# Patient Record
Sex: FEMALE | Race: WHITE | NOT HISPANIC OR LATINO | Employment: FULL TIME | URBAN - METROPOLITAN AREA
[De-identification: names, ages, dates, MRNs, and addresses within clinical notes are randomized per-mention and may not be internally consistent; named-entity substitution may affect disease eponyms.]

---

## 2023-03-24 DIAGNOSIS — E06.3 HASHIMOTO'S DISEASE: Primary | ICD-10-CM

## 2024-06-05 ENCOUNTER — OFFICE VISIT (OUTPATIENT)
Dept: ENDOCRINOLOGY | Facility: CLINIC | Age: 29
End: 2024-06-05
Payer: COMMERCIAL

## 2024-06-05 ENCOUNTER — LAB VISIT (OUTPATIENT)
Dept: LAB | Facility: HOSPITAL | Age: 29
End: 2024-06-05
Attending: NURSE PRACTITIONER
Payer: COMMERCIAL

## 2024-06-05 VITALS
WEIGHT: 184.63 LBS | TEMPERATURE: 99 F | HEIGHT: 65 IN | SYSTOLIC BLOOD PRESSURE: 114 MMHG | HEART RATE: 92 BPM | DIASTOLIC BLOOD PRESSURE: 82 MMHG | RESPIRATION RATE: 20 BRPM | BODY MASS INDEX: 30.76 KG/M2

## 2024-06-05 DIAGNOSIS — E06.3 HYPOTHYROIDISM DUE TO HASHIMOTO'S THYROIDITIS: ICD-10-CM

## 2024-06-05 DIAGNOSIS — R79.89 LOW TESTOSTERONE LEVEL IN FEMALE: ICD-10-CM

## 2024-06-05 DIAGNOSIS — E06.3 HYPOTHYROIDISM DUE TO HASHIMOTO'S THYROIDITIS: Primary | ICD-10-CM

## 2024-06-05 DIAGNOSIS — E03.8 HYPOTHYROIDISM DUE TO HASHIMOTO'S THYROIDITIS: Primary | ICD-10-CM

## 2024-06-05 DIAGNOSIS — E03.8 HYPOTHYROIDISM DUE TO HASHIMOTO'S THYROIDITIS: ICD-10-CM

## 2024-06-05 LAB
CORTIS SERPL-SCNC: 10.4 UG/DL
T3FREE SERPL-MCNC: 3.35 PG/ML (ref 1.58–3.91)
T4 FREE SERPL-MCNC: 1.02 NG/DL (ref 0.7–1.48)
TESTOST SERPL-MCNC: 40.03 NG/DL (ref 13.84–53.35)
TSH SERPL-ACNC: 1.32 UIU/ML (ref 0.35–4.94)

## 2024-06-05 PROCEDURE — 84481 FREE ASSAY (FT-3): CPT

## 2024-06-05 PROCEDURE — 84443 ASSAY THYROID STIM HORMONE: CPT

## 2024-06-05 PROCEDURE — 99203 OFFICE O/P NEW LOW 30 MIN: CPT | Mod: S$PBB,,, | Performed by: NURSE PRACTITIONER

## 2024-06-05 PROCEDURE — 82627 DEHYDROEPIANDROSTERONE: CPT

## 2024-06-05 PROCEDURE — 3008F BODY MASS INDEX DOCD: CPT | Mod: CPTII,,, | Performed by: NURSE PRACTITIONER

## 2024-06-05 PROCEDURE — 84403 ASSAY OF TOTAL TESTOSTERONE: CPT

## 2024-06-05 PROCEDURE — 3074F SYST BP LT 130 MM HG: CPT | Mod: CPTII,,, | Performed by: NURSE PRACTITIONER

## 2024-06-05 PROCEDURE — 86376 MICROSOMAL ANTIBODY EACH: CPT

## 2024-06-05 PROCEDURE — 3079F DIAST BP 80-89 MM HG: CPT | Mod: CPTII,,, | Performed by: NURSE PRACTITIONER

## 2024-06-05 PROCEDURE — 83520 IMMUNOASSAY QUANT NOS NONAB: CPT

## 2024-06-05 PROCEDURE — 82533 TOTAL CORTISOL: CPT

## 2024-06-05 PROCEDURE — 36415 COLL VENOUS BLD VENIPUNCTURE: CPT

## 2024-06-05 PROCEDURE — 99214 OFFICE O/P EST MOD 30 MIN: CPT | Mod: PBBFAC | Performed by: NURSE PRACTITIONER

## 2024-06-05 PROCEDURE — 84445 ASSAY OF TSI GLOBULIN: CPT

## 2024-06-05 PROCEDURE — 1160F RVW MEDS BY RX/DR IN RCRD: CPT | Mod: CPTII,,, | Performed by: NURSE PRACTITIONER

## 2024-06-05 PROCEDURE — 1159F MED LIST DOCD IN RCRD: CPT | Mod: CPTII,,, | Performed by: NURSE PRACTITIONER

## 2024-06-05 PROCEDURE — 84439 ASSAY OF FREE THYROXINE: CPT

## 2024-06-05 RX ORDER — BUPROPION HYDROCHLORIDE 300 MG/1
300 TABLET ORAL DAILY
COMMUNITY
Start: 2024-05-29

## 2024-06-05 RX ORDER — ETONOGESTREL AND ETHINYL ESTRADIOL .015; .12 MG/D; MG/D
1 INSERT, EXTENDED RELEASE VAGINAL
COMMUNITY
Start: 2024-05-19

## 2024-06-05 RX ORDER — LEVOTHYROXINE SODIUM 50 UG/1
50 TABLET ORAL EVERY MORNING
COMMUNITY
Start: 2024-05-28

## 2024-06-05 RX ORDER — LISDEXAMFETAMINE DIMESYLATE 40 MG/1
40 CAPSULE ORAL DAILY
COMMUNITY
Start: 2024-05-29

## 2024-06-05 NOTE — PROGRESS NOTES
Subjective     Patient ID: Ronny Charles is a 28 y.o. female.    Chief Complaint: New Patient  (Referred for Hashimoto's Disease)    Endocrine Clinic Note 06/05/2024: 29 y/o female referred today as new pt for hx Hashimoto's thyroiditis. Currently on LT4 50 mcg. TPO on referral 15 H with range below 9. TSH 0.966 Free T4 1.30 on 03/08/2023 no recent labs on her chart. Referral also states low testosterone in female.  Patient states she was diagnosed at age 11 and has been on levothyroxine 50 mcg for multiple years.  Patient reports that she continues to gain weight she recently started compound GLP-1 8 weeks ago and has lost a total of 12 lbs.  Patient reports previously she was told she had low testosterone she was being tested due to fatigue and low libido.  On patient's referral testosterone level was 34.  Patient reports to continue to have fatigue, low libido, and weight gain but has been losing since being on a GLP 1.       Review of Systems   Constitutional:  Negative for activity change, appetite change and fatigue.   HENT:  Negative for dental problem, hearing loss, tinnitus, trouble swallowing and goiter.    Eyes:  Negative for photophobia, pain and visual disturbance.   Respiratory:  Negative for cough, chest tightness and wheezing.    Cardiovascular:  Negative for chest pain, palpitations and leg swelling.   Gastrointestinal:  Negative for abdominal pain, constipation, diarrhea, nausea and reflux.   Endocrine: Negative for cold intolerance, heat intolerance, polydipsia and polyphagia.   Genitourinary:  Negative for difficulty urinating, flank pain, hematuria, hot flashes, menstrual irregularity, menstrual problem, nocturia and urgency.   Musculoskeletal:  Negative for back pain, gait problem, joint swelling, leg pain and joint deformity.   Integumentary:  Negative for color change, pallor, rash and breast discharge.   Allergic/Immunologic: Negative for environmental allergies, food allergies and  immunocompromised state.   Neurological:  Negative for tremors, seizures, headaches, memory loss and coordination difficulties.   Psychiatric/Behavioral:  Negative for agitation, behavioral problems and sleep disturbance. The patient is not nervous/anxious.           Objective     Physical Exam  Constitutional:       General: She is not in acute distress.     Appearance: Normal appearance. She is not ill-appearing.   HENT:      Head: Normocephalic and atraumatic.      Right Ear: External ear normal.      Left Ear: External ear normal.      Nose: Nose normal. No congestion or rhinorrhea.      Mouth/Throat:      Mouth: Mucous membranes are moist.      Pharynx: Oropharynx is clear. No oropharyngeal exudate.   Eyes:      General:         Right eye: No discharge.         Left eye: No discharge.      Conjunctiva/sclera: Conjunctivae normal.      Pupils: Pupils are equal, round, and reactive to light.   Neck:      Thyroid: No thyroid mass, thyromegaly or thyroid tenderness.   Cardiovascular:      Rate and Rhythm: Normal rate and regular rhythm.      Pulses: Normal pulses.      Heart sounds: Normal heart sounds. No murmur heard.  Pulmonary:      Effort: Pulmonary effort is normal. No respiratory distress.      Breath sounds: Normal breath sounds.   Abdominal:      General: Abdomen is flat. Bowel sounds are normal. There is no distension.      Palpations: Abdomen is soft.      Tenderness: There is no abdominal tenderness.   Musculoskeletal:         General: No swelling or tenderness. Normal range of motion.      Cervical back: Normal range of motion and neck supple. No tenderness.      Right lower leg: No edema.      Left lower leg: No edema.   Feet:      Right foot:      Skin integrity: Skin integrity normal.      Left foot:      Skin integrity: Skin integrity normal.   Lymphadenopathy:      Cervical: No cervical adenopathy.   Skin:     General: Skin is warm and dry.      Coloration: Skin is not jaundiced or pale.    Neurological:      General: No focal deficit present.      Mental Status: She is alert and oriented to person, place, and time. Mental status is at baseline.      Coordination: Coordination normal.      Gait: Gait normal.   Psychiatric:         Mood and Affect: Mood normal.         Behavior: Behavior normal.         Thought Content: Thought content normal.            Assessment and Plan     1. Hypothyroidism due to Hashimoto's thyroiditis  LT4 50 mcg   Labs today TSH, free T4, free T3, TPO TSI, TraB  Adjust levothyroxine if needed  -     Ambulatory referral/consult to Endocrinology  -     T4, Free; Future  -     T3, Free (OLG); Future  -     TSH; Future  -     Thyrotropin Receptor Antibody; Future  -     Thyroid Stimulating Immunoglobulin; Future  -     THYROID PEROXIDASE (TPO); Future  -     Ambulatory referral/consult to Endocrinology    2. Low testosterone level in female  DHEA, testosterone cortisol levels today  -     DHEA-Sulfate; Future  -     Testosterone; Future  -     Cortisol; Future            Follow up in about 4 months (around 10/5/2024) for Hypothyroidism.    I spent a total of 30 minutes on the day of the visit.  This includes face to face time and non-face to face time preparing to see the patient (eg, review of tests), obtaining and/or reviewing separately obtained history, documenting clinical information in the electronic or other health record, independently interpreting results and communicating results to the patient/family/caregiver, or care coordinator.

## 2024-06-06 LAB
DHEA-S SERPL-MCNC: 184 MCG/DL (ref 83–377)
THYROID PEROXIDASE QUANT (OLG): 10 IU/ML
TSH RECEP AB SER-ACNC: <1.1 IU/L (ref 0–1.75)

## 2024-06-07 ENCOUNTER — PATIENT MESSAGE (OUTPATIENT)
Dept: ENDOCRINOLOGY | Facility: CLINIC | Age: 29
End: 2024-06-07
Payer: COMMERCIAL

## 2024-06-10 LAB — TSI SER-ACNC: <1 TSI INDEX

## 2024-10-16 ENCOUNTER — TELEPHONE (OUTPATIENT)
Dept: ENDOCRINOLOGY | Facility: CLINIC | Age: 29
End: 2024-10-16
Payer: COMMERCIAL

## 2024-10-16 DIAGNOSIS — R79.89 LOW TESTOSTERONE LEVEL IN FEMALE: Primary | ICD-10-CM

## 2024-10-16 NOTE — TELEPHONE ENCOUNTER
----- Message from Caron sent at 10/16/2024 10:25 AM CDT -----  Patient of Jade           Patient has been rescheduled to virtual visit on 12/12/2024    Patient stated, with last appointment Jade requested labs.    Can you please place orders for labs if needed ?       Thanks

## 2024-10-17 NOTE — TELEPHONE ENCOUNTER
Called and informed patient of lab orders put in , patient verbalized will complete before appointment.

## 2024-12-06 ENCOUNTER — LAB VISIT (OUTPATIENT)
Dept: LAB | Facility: HOSPITAL | Age: 29
End: 2024-12-06
Attending: NURSE PRACTITIONER
Payer: COMMERCIAL

## 2024-12-06 DIAGNOSIS — R79.89 LOW TESTOSTERONE LEVEL IN FEMALE: ICD-10-CM

## 2024-12-06 LAB
TESTOST SERPL-MCNC: 75.42 NG/DL (ref 13.84–53.35)
TSH SERPL-ACNC: 0.76 UIU/ML (ref 0.35–4.94)

## 2024-12-06 PROCEDURE — 82627 DEHYDROEPIANDROSTERONE: CPT

## 2024-12-06 PROCEDURE — 36415 COLL VENOUS BLD VENIPUNCTURE: CPT

## 2024-12-06 PROCEDURE — 84443 ASSAY THYROID STIM HORMONE: CPT

## 2024-12-06 PROCEDURE — 84403 ASSAY OF TOTAL TESTOSTERONE: CPT

## 2024-12-07 LAB — DHEA-S SERPL-MCNC: 82 MCG/DL (ref 83–377)

## 2024-12-10 ENCOUNTER — PATIENT MESSAGE (OUTPATIENT)
Dept: ENDOCRINOLOGY | Facility: CLINIC | Age: 29
End: 2024-12-10
Payer: COMMERCIAL

## 2024-12-12 ENCOUNTER — OFFICE VISIT (OUTPATIENT)
Dept: ENDOCRINOLOGY | Facility: CLINIC | Age: 29
End: 2024-12-12
Payer: COMMERCIAL

## 2024-12-12 ENCOUNTER — PATIENT MESSAGE (OUTPATIENT)
Dept: ENDOCRINOLOGY | Facility: CLINIC | Age: 29
End: 2024-12-12

## 2024-12-12 ENCOUNTER — TELEPHONE (OUTPATIENT)
Dept: ENDOCRINOLOGY | Facility: CLINIC | Age: 29
End: 2024-12-12

## 2024-12-12 DIAGNOSIS — E06.3 HYPOTHYROIDISM DUE TO HASHIMOTO'S THYROIDITIS: Primary | ICD-10-CM

## 2024-12-12 RX ORDER — CITALOPRAM 10 MG/1
10 TABLET ORAL DAILY
COMMUNITY

## 2024-12-12 RX ORDER — BUSPIRONE HYDROCHLORIDE 5 MG/1
5 TABLET ORAL 2 TIMES DAILY
COMMUNITY

## 2024-12-12 NOTE — PROGRESS NOTES
Subjective     Patient ID: Ronny Charles is a 29 y.o. female.    Chief Complaint: No chief complaint on file.    Endocrine Clinic Note 06/05/2024: 29 y/o female referred today as new pt for hx Hashimoto's thyroiditis. Currently on LT4 50 mcg. TPO on referral 15 H with range below 9. TSH 0.966 Free T4 1.30 on 03/08/2023 no recent labs on her chart. Referral also states low testosterone in female.  Patient states she was diagnosed at age 11 and has been on levothyroxine 50 mcg for multiple years.  Patient reports that she continues to gain weight she recently started compound GLP-1 8 weeks ago and has lost a total of 12 lbs.  Patient reports previously she was told she had low testosterone she was being tested due to fatigue and low libido.  On patient's referral testosterone level was 34.  Patient reports to continue to have fatigue, low libido, and weight gain but has been losing since being on a GLP 1.      The patient location is: Home   The chief complaint leading to consultation is:  Hashimoto's/hypothyroidism    Visit type: audiovisual    Face to Face time with patient: 8  18 minutes of total time spent on the encounter, which includes face to face time and non-face to face time preparing to see the patient (eg, review of tests), Obtaining and/or reviewing separately obtained history, Documenting clinical information in the electronic or other health record, Independently interpreting results (not separately reported) and communicating results to the patient/family/caregiver, or Care coordination (not separately reported).         Each patient to whom he or she provides medical services by telemedicine is:  (1) informed of the relationship between the physician and patient and the respective role of any other health care provider with respect to management of the patient; and (2) notified that he or she may decline to receive medical services by telemedicine and may withdraw from such care at any  time.    Telemedicine Notes:  Endocrine clinic note 12/12/2024:  29 year female scheduled today for endocrine clinic follow-up.  History of Hashimoto's/hypothyroidism.  Patient is currently on levothyroxine 50 mcg.  Current TSH 0.757 on 12/06/2024.  Patient has been on levothyroxine 50 mcg for over 15 years.  Previously patient was on a GLP 1 for weight loss and lost 12 lb she states currently she is off of Ozempic she states she has been busy with the holidays.  Patient reports continued fatigue and  low libido.          Review of Systems   Constitutional:  Negative for activity change, appetite change and fatigue.   HENT:  Negative for dental problem, hearing loss, tinnitus, trouble swallowing and goiter.    Eyes:  Negative for photophobia, pain and visual disturbance.   Respiratory:  Negative for cough, chest tightness and wheezing.    Cardiovascular:  Negative for chest pain, palpitations and leg swelling.   Gastrointestinal:  Negative for abdominal pain, constipation, diarrhea, nausea and reflux.   Endocrine: Negative for cold intolerance, heat intolerance, polydipsia and polyphagia.   Genitourinary:  Negative for difficulty urinating, flank pain, hematuria, hot flashes, menstrual irregularity, menstrual problem, nocturia and urgency.   Musculoskeletal:  Negative for back pain, gait problem, joint swelling, leg pain and joint deformity.   Integumentary:  Negative for color change, pallor, rash and breast discharge.   Allergic/Immunologic: Negative for environmental allergies, food allergies and immunocompromised state.   Neurological:  Negative for tremors, seizures, headaches, memory loss and coordination difficulties.   Psychiatric/Behavioral:  Negative for agitation, behavioral problems and sleep disturbance. The patient is not nervous/anxious.           Objective     Physical Exam  Constitutional:       General: She is not in acute distress.     Appearance: Normal appearance. She is not ill-appearing.    HENT:      Head: Normocephalic and atraumatic.      Right Ear: External ear normal.      Left Ear: External ear normal.      Nose: Nose normal. No congestion or rhinorrhea.      Mouth/Throat:      Mouth: Mucous membranes are moist.      Pharynx: Oropharynx is clear. No oropharyngeal exudate.   Eyes:      General:         Right eye: No discharge.         Left eye: No discharge.      Conjunctiva/sclera: Conjunctivae normal.      Pupils: Pupils are equal, round, and reactive to light.   Neck:      Thyroid: No thyroid mass, thyromegaly or thyroid tenderness.   Cardiovascular:      Rate and Rhythm: Normal rate and regular rhythm.      Pulses: Normal pulses.      Heart sounds: Normal heart sounds. No murmur heard.  Pulmonary:      Effort: Pulmonary effort is normal. No respiratory distress.      Breath sounds: Normal breath sounds.   Abdominal:      General: Abdomen is flat. Bowel sounds are normal. There is no distension.      Palpations: Abdomen is soft.      Tenderness: There is no abdominal tenderness.   Musculoskeletal:         General: No swelling or tenderness. Normal range of motion.      Cervical back: Normal range of motion and neck supple. No tenderness.      Right lower leg: No edema.      Left lower leg: No edema.   Feet:      Right foot:      Skin integrity: Skin integrity normal.      Left foot:      Skin integrity: Skin integrity normal.   Lymphadenopathy:      Cervical: No cervical adenopathy.   Skin:     General: Skin is warm and dry.      Coloration: Skin is not jaundiced or pale.   Neurological:      General: No focal deficit present.      Mental Status: She is alert and oriented to person, place, and time. Mental status is at baseline.      Coordination: Coordination normal.      Gait: Gait normal.   Psychiatric:         Mood and Affect: Mood normal.         Behavior: Behavior normal.         Thought Content: Thought content normal.            Assessment and Plan     1. Hypothyroidism due to  Hashimoto's thyroiditis  LT4 50 mcg   TSH 0.757 on 12/06/2024   Component Ref Range & Units 6 d ago 6 mo ago   TSH 0.350 - 4.940 uIU/mL 0.757 1.317         Follow up in about 6 months (around 6/12/2025).

## 2025-06-13 ENCOUNTER — RESULTS FOLLOW-UP (OUTPATIENT)
Dept: ENDOCRINOLOGY | Facility: CLINIC | Age: 30
End: 2025-06-13

## 2025-06-13 ENCOUNTER — LAB VISIT (OUTPATIENT)
Dept: LAB | Facility: HOSPITAL | Age: 30
End: 2025-06-13
Attending: NURSE PRACTITIONER
Payer: COMMERCIAL

## 2025-06-13 ENCOUNTER — OFFICE VISIT (OUTPATIENT)
Dept: ENDOCRINOLOGY | Facility: CLINIC | Age: 30
End: 2025-06-13
Payer: COMMERCIAL

## 2025-06-13 VITALS
TEMPERATURE: 97 F | WEIGHT: 208 LBS | HEIGHT: 65 IN | RESPIRATION RATE: 18 BRPM | BODY MASS INDEX: 34.66 KG/M2 | SYSTOLIC BLOOD PRESSURE: 129 MMHG | DIASTOLIC BLOOD PRESSURE: 89 MMHG | HEART RATE: 109 BPM

## 2025-06-13 DIAGNOSIS — E06.3 HYPOTHYROIDISM DUE TO HASHIMOTO'S THYROIDITIS: Primary | ICD-10-CM

## 2025-06-13 DIAGNOSIS — R68.82 LOW LIBIDO: ICD-10-CM

## 2025-06-13 DIAGNOSIS — R73.01 IMPAIRED FASTING GLUCOSE: ICD-10-CM

## 2025-06-13 DIAGNOSIS — E06.3 HYPOTHYROIDISM DUE TO HASHIMOTO'S THYROIDITIS: ICD-10-CM

## 2025-06-13 LAB
EST. AVERAGE GLUCOSE BLD GHB EST-MCNC: 96.8 MG/DL
HBA1C MFR BLD: 5 %
T4 FREE SERPL-MCNC: 0.99 NG/DL (ref 0.7–1.48)
TESTOST SERPL-MCNC: 71.64 NG/DL (ref 13.84–53.35)
TSH SERPL-ACNC: 1.8 UIU/ML (ref 0.35–4.94)

## 2025-06-13 PROCEDURE — 82627 DEHYDROEPIANDROSTERONE: CPT

## 2025-06-13 PROCEDURE — 36415 COLL VENOUS BLD VENIPUNCTURE: CPT

## 2025-06-13 PROCEDURE — 99214 OFFICE O/P EST MOD 30 MIN: CPT | Mod: PBBFAC | Performed by: NURSE PRACTITIONER

## 2025-06-13 PROCEDURE — 84443 ASSAY THYROID STIM HORMONE: CPT

## 2025-06-13 PROCEDURE — 84403 ASSAY OF TOTAL TESTOSTERONE: CPT

## 2025-06-13 PROCEDURE — 84439 ASSAY OF FREE THYROXINE: CPT

## 2025-06-13 PROCEDURE — 83036 HEMOGLOBIN GLYCOSYLATED A1C: CPT

## 2025-06-13 RX ORDER — CITALOPRAM 20 MG/1
TABLET ORAL
COMMUNITY

## 2025-06-13 RX ORDER — LEVOTHYROXINE SODIUM 75 UG/1
TABLET ORAL
Qty: 28 TABLET | Refills: 11 | Status: SHIPPED | OUTPATIENT
Start: 2025-06-13

## 2025-06-13 NOTE — PROGRESS NOTES
Subjective     Patient ID: Ronny Charles is a 29 y.o. female.    Chief Complaint: Hypothyroidism    Endocrine Clinic Note 06/05/2024: 29 y/o female referred today as new pt for hx Hashimoto's thyroiditis. Currently on LT4 50 mcg. TPO on referral 15 H with range below 9. TSH 0.966 Free T4 1.30 on 03/08/2023 no recent labs on her chart. Referral also states low testosterone in female.  Patient states she was diagnosed at age 11 and has been on levothyroxine 50 mcg for multiple years.  Patient reports that she continues to gain weight she recently started compound GLP-1 8 weeks ago and has lost a total of 12 lbs.  Patient reports previously she was told she had low testosterone she was being tested due to fatigue and low libido.  On patient's referral testosterone level was 34.  Patient reports to continue to have fatigue, low libido, and weight gain but has been losing since being on a GLP 1.      Telemedicine Notes:  Endocrine clinic note 12/12/2024:  29 year female scheduled today for endocrine clinic follow-up.  History of Hashimoto's/hypothyroidism.  Patient is currently on levothyroxine 50 mcg.  Current TSH 0.757 on 12/06/2024.  Patient has been on levothyroxine 50 mcg for over 15 years.  Previously patient was on a GLP 1 for weight loss and lost 12 lb she states currently she is off of Ozempic she states she has been busy with the holidays.  Patient reports continued fatigue and  low libido.          Endocrine clinic note 06/13/2025:  29 year female scheduled today for endocrine clinic follow-up.  History of Hashimoto's/hypothyroidism.  Patient is currently on levothyroxine 50 mcg.  Patient has had over 25 lb weight gain over the last year.  Patient previously was on a GLP 1 and lost 36 lb stopped abruptly and has gained weight.  Patient reports severe fatigue and difficulty losing weight.  Previously patient was started on DHEA testosterone level elevated the 75.42 and DHEA was stopped patient states she  did not feel any different with her fatigue.         Review of Systems   Constitutional:  Negative for activity change, appetite change and fatigue.   HENT:  Negative for dental problem, hearing loss, tinnitus, trouble swallowing and goiter.    Eyes:  Negative for photophobia, pain and visual disturbance.   Respiratory:  Negative for cough, chest tightness and wheezing.    Cardiovascular:  Negative for chest pain, palpitations and leg swelling.   Gastrointestinal:  Negative for abdominal pain, constipation, diarrhea, nausea and reflux.   Endocrine: Negative for cold intolerance, heat intolerance, polydipsia and polyphagia.   Genitourinary:  Negative for difficulty urinating, flank pain, hematuria, hot flashes, menstrual irregularity, menstrual problem, nocturia and urgency.   Musculoskeletal:  Negative for back pain, gait problem, joint swelling, leg pain and joint deformity.   Integumentary:  Negative for color change, pallor, rash and breast discharge.   Allergic/Immunologic: Negative for environmental allergies, food allergies and immunocompromised state.   Neurological:  Negative for tremors, seizures, headaches, coordination difficulties and memory loss.   Psychiatric/Behavioral:  Negative for agitation, behavioral problems and sleep disturbance. The patient is not nervous/anxious.           Objective     Physical Exam  Constitutional:       General: She is not in acute distress.     Appearance: Normal appearance. She is not ill-appearing.   HENT:      Head: Normocephalic and atraumatic.      Right Ear: External ear normal.      Left Ear: External ear normal.      Nose: Nose normal. No congestion or rhinorrhea.      Mouth/Throat:      Mouth: Mucous membranes are moist.      Pharynx: Oropharynx is clear. No oropharyngeal exudate.   Eyes:      General:         Right eye: No discharge.         Left eye: No discharge.      Conjunctiva/sclera: Conjunctivae normal.      Pupils: Pupils are equal, round, and reactive  to light.   Neck:      Thyroid: No thyroid mass, thyromegaly or thyroid tenderness.   Cardiovascular:      Rate and Rhythm: Normal rate and regular rhythm.      Pulses: Normal pulses.      Heart sounds: Normal heart sounds. No murmur heard.  Pulmonary:      Effort: Pulmonary effort is normal. No respiratory distress.      Breath sounds: Normal breath sounds.   Abdominal:      General: Abdomen is flat. Bowel sounds are normal. There is no distension.      Palpations: Abdomen is soft.      Tenderness: There is no abdominal tenderness.   Musculoskeletal:         General: No swelling or tenderness. Normal range of motion.      Cervical back: Normal range of motion and neck supple. No tenderness.      Right lower leg: No edema.      Left lower leg: No edema.   Feet:      Right foot:      Skin integrity: Skin integrity normal.      Left foot:      Skin integrity: Skin integrity normal.   Lymphadenopathy:      Cervical: No cervical adenopathy.   Skin:     General: Skin is warm and dry.      Coloration: Skin is not jaundiced or pale.   Neurological:      General: No focal deficit present.      Mental Status: She is alert and oriented to person, place, and time. Mental status is at baseline.      Coordination: Coordination normal.      Gait: Gait normal.   Psychiatric:         Mood and Affect: Mood normal.         Behavior: Behavior normal.         Thought Content: Thought content normal.            Assessment and Plan     1. Hypothyroidism due to Hashimoto's thyroiditis  LT4 50 mcg   TSH 0.757 on 12/06/2024        Component  Ref Range & Units (hover) 6 mo ago 1 yr ago   TSH 0.757 1.317      -     TSH; Future; Expected date: 06/13/2025  -     T4, Free; Future; Expected date: 06/13/2025    2. Impaired fasting glucose  -     Hemoglobin A1C; Future; Expected date: 06/13/2025    3. Low libido  Testosterone   -     DHEA-Sulfate; Future; Expected date: 06/13/2025  -     Testosterone,Total; Future; Expected date: 06/13/2025        Component  Ref Range & Units (hover) 6 mo ago 1 yr ago   DHEA SULFATE 82 Low  184 CM             Component  Ref Range & Units (hover) 6 mo ago 1 yr ago   Testosterone Total 75.42 High  40.03              Follow up in about 6 months (around 12/13/2025) for Hashimotos, Hypothyroidism.

## 2025-06-16 ENCOUNTER — TELEPHONE (OUTPATIENT)
Dept: ENDOCRINOLOGY | Facility: CLINIC | Age: 30
End: 2025-06-16
Payer: COMMERCIAL

## 2025-06-16 LAB — DHEA-S SERPL-MCNC: 211 MCG/DL (ref 83–377)

## 2025-06-16 NOTE — TELEPHONE ENCOUNTER
----- Message from Yanelis sent at 6/16/2025  7:03 AM CDT -----  Patient of Jade    Patient returning phone call.     Patient contact # [phone].    7:04  Thanks,

## 2025-07-25 ENCOUNTER — TELEPHONE (OUTPATIENT)
Dept: ENDOCRINOLOGY | Facility: CLINIC | Age: 30
End: 2025-07-25
Payer: COMMERCIAL

## 2025-07-25 NOTE — TELEPHONE ENCOUNTER
----- Message from Jade Silveira NP sent at 6/13/2025  9:40 AM CDT -----  Regarding: Repeat labs  Please let the patient know to repeat her labs at this time